# Patient Record
Sex: MALE | Race: WHITE | NOT HISPANIC OR LATINO | Employment: PART TIME | URBAN - METROPOLITAN AREA
[De-identification: names, ages, dates, MRNs, and addresses within clinical notes are randomized per-mention and may not be internally consistent; named-entity substitution may affect disease eponyms.]

---

## 2024-04-20 ENCOUNTER — APPOINTMENT (OUTPATIENT)
Dept: GENERAL RADIOLOGY | Facility: HOSPITAL | Age: 19
End: 2024-04-20
Payer: COMMERCIAL

## 2024-04-20 ENCOUNTER — HOSPITAL ENCOUNTER (EMERGENCY)
Facility: HOSPITAL | Age: 19
Discharge: HOME | End: 2024-04-20
Attending: EMERGENCY MEDICINE
Payer: COMMERCIAL

## 2024-04-20 VITALS
RESPIRATION RATE: 30 BRPM | WEIGHT: 155 LBS | DIASTOLIC BLOOD PRESSURE: 92 MMHG | TEMPERATURE: 98.8 F | SYSTOLIC BLOOD PRESSURE: 128 MMHG | HEART RATE: 102 BPM | BODY MASS INDEX: 22.19 KG/M2 | OXYGEN SATURATION: 98 % | HEIGHT: 70 IN

## 2024-04-20 DIAGNOSIS — S83.005A CLOSED DISLOCATION OF LEFT PATELLA, INITIAL ENCOUNTER: Primary | ICD-10-CM

## 2024-04-20 PROCEDURE — 73560 X-RAY EXAM OF KNEE 1 OR 2: CPT

## 2024-04-20 PROCEDURE — 96374 THER/PROPH/DIAG INJ IV PUSH: CPT

## 2024-04-20 PROCEDURE — 25010000002 FENTANYL CITRATE (PF) 50 MCG/ML SOLUTION: Performed by: EMERGENCY MEDICINE

## 2024-04-20 PROCEDURE — 99285 EMERGENCY DEPT VISIT HI MDM: CPT

## 2024-04-20 PROCEDURE — 96375 TX/PRO/DX INJ NEW DRUG ADDON: CPT

## 2024-04-20 PROCEDURE — 25010000002 HYDROMORPHONE 1 MG/ML SOLUTION: Performed by: EMERGENCY MEDICINE

## 2024-04-20 PROCEDURE — 25010000002 ONDANSETRON PER 1 MG: Performed by: EMERGENCY MEDICINE

## 2024-04-20 RX ORDER — ONDANSETRON 2 MG/ML
4 INJECTION INTRAMUSCULAR; INTRAVENOUS ONCE
Status: COMPLETED | OUTPATIENT
Start: 2024-04-20 | End: 2024-04-20

## 2024-04-20 RX ORDER — HYDROCODONE BITARTRATE AND ACETAMINOPHEN 5; 325 MG/1; MG/1
1 TABLET ORAL ONCE
Status: COMPLETED | OUTPATIENT
Start: 2024-04-20 | End: 2024-04-20

## 2024-04-20 RX ORDER — FENTANYL CITRATE 50 UG/ML
50 INJECTION, SOLUTION INTRAMUSCULAR; INTRAVENOUS ONCE
Status: COMPLETED | OUTPATIENT
Start: 2024-04-20 | End: 2024-04-20

## 2024-04-20 RX ORDER — HYDROCODONE BITARTRATE AND ACETAMINOPHEN 5; 325 MG/1; MG/1
1 TABLET ORAL EVERY 6 HOURS PRN
Qty: 12 TABLET | Refills: 0 | Status: SHIPPED | OUTPATIENT
Start: 2024-04-20

## 2024-04-20 RX ADMIN — FENTANYL CITRATE 50 MCG: 50 INJECTION, SOLUTION INTRAMUSCULAR; INTRAVENOUS at 21:09

## 2024-04-20 RX ADMIN — HYDROMORPHONE HYDROCHLORIDE 0.5 MG: 1 INJECTION, SOLUTION INTRAMUSCULAR; INTRAVENOUS; SUBCUTANEOUS at 20:06

## 2024-04-20 RX ADMIN — ONDANSETRON 4 MG: 2 INJECTION INTRAMUSCULAR; INTRAVENOUS at 20:06

## 2024-04-20 RX ADMIN — HYDROCODONE BITARTRATE AND ACETAMINOPHEN 1 TABLET: 5; 325 TABLET ORAL at 22:45

## 2024-04-21 NOTE — ED PROVIDER NOTES
"Subjective   History of Present Illness  Chief complaint: Patient is a 18-year-old who was playing volleyball he jumped up and fell down landing awkwardly on his knee.  This is his left knee.  He felt a \"pop\".  And there was a deformity with severe pain.  He presented here for further evaluation.  This happened just prior to arrival.  There is no other injury.  No head injury.  No numbness.  No open wound    Context:    Duration:    Timing: Subacute onset    Severity: Severe    Associated Symptoms:        PCP:  LMP:      Review of Systems   Cardiovascular:  Negative for chest pain.   Gastrointestinal:  Negative for abdominal pain.   Genitourinary:  Negative for flank pain.   Musculoskeletal:  Positive for arthralgias.   Skin: Negative.    Neurological: Negative.        No past medical history on file.    No Known Allergies    No past surgical history on file.    No family history on file.    Social History     Socioeconomic History    Marital status: Single           Objective   Physical Exam  Vitals and nursing note reviewed.   Constitutional:       Appearance: Normal appearance.   HENT:      Head: Normocephalic and atraumatic.   Pulmonary:      Effort: Pulmonary effort is normal.   Musculoskeletal:      Left knee: Deformity and bony tenderness present. Decreased range of motion. Tenderness present. Abnormal alignment. Normal pulse.        Legs:       Comments: Patient holding leg externally in severe pain.  Patella appears to be dislocated.  Patient is neurovascular intact distally.   Skin:     General: Skin is warm and dry.      Comments: No open wound present.   Neurological:      Mental Status: He is alert.      Sensory: No sensory deficit.      Motor: No weakness.   Psychiatric:         Mood and Affect: Mood normal.         Thought Content: Thought content normal.         Lower Extremity Dislocation    Date/Time: 4/20/2024 9:20 PM    Performed by: Terry Sal DO  Authorized by: Terry Sal " "DO [place]  Consent: Verbal consent obtained.  Risks and benefits: risks, benefits and alternatives were discussed  Consent given by: patient  Patient understanding: patient states understanding of the procedure being performed  Imaging studies: imaging studies available  Patient identity confirmed: verbally with patient  Time out: Immediately prior to procedure a \"time out\" was called to verify the correct patient, procedure, equipment, support staff and site/side marked as required.  Injury location: knee  Location details: left knee  Injury type: dislocation  Dislocation type: lateral patellar  Pre-procedure neurovascular assessment: neurovascularly intact  Pre-procedure distal perfusion: normal  Pre-procedure neurological function: normal  Pre-procedure range of motion: normal    Anesthesia:  Local anesthesia used: no    Sedation:  Patient sedated: dilaudid and fentanyl.    Manipulation performed: yes  Reduction method: Extension with medial manipulation of patella.  X-ray confirmed reduction: yes  Splint type: knee immobilizer.  Post-procedure distal perfusion: normal  Post-procedure neurological function: normal  Patient tolerance: patient tolerated the procedure well with no immediate complications                 ED Course       Adult Nutrition Assessment       Row Name 04/20/24 1949       Anthropometrics    Height 177.8 cm (70\")    Weight 70.3 kg (155 lb)                                                  XR Knee 1 or 2 View Left    Result Date: 4/20/2024  Impression: Normal location of the patella. No evidence of fracture. Electronically Signed: Harriet Santana MD  4/20/2024 10:07 PM EDT  Workstation ID: [id number]    XR Knee 1 or 2 View Left    Result Date: 4/20/2024  Impression: No evidence of fracture. Previously described lateral patellar subluxation does not appear significantly changed as compared to the previous study although there is a difference in positioning. Correlate clinically. Sunrise view may be " warranted. Electronically Signed: Harriet Santana MD  4/20/2024 9:45 PM EDT  Workstation ID: [id number]    XR Knee 1 or 2 View Left    Result Date: 4/20/2024  Impression: Questionable lateral patellar subluxation although this may be projectional. Sunrise view would be helpful for further evaluation. No fracture identified. Electronically Signed: Harriet Santana MD  4/20/2024 8:31 PM EDT  Workstation ID: [id number]          Medical Decision Making  Patient was seen evaluated for the above problem    Differential diagnose includes but not limited to patella dislocation, patella fracture, knee dislocation, tibial plateau fracture,    Patient was neurovascular intact distally on exam.  No signs of compartment syndrome.  He had x-ray and physical exam consistent with patella dislocation.  After pain medication here in the emergency department he did receive dislocation reduction.  See procedure note.  Patient tolerated procedure well.  Patient was placed in knee immobilizer and provided crutches.  Some pain medicine for home as well.  Orthopedic follow-up provided.  Discussed with patient and family.  They verbalized understanding are okay with this.    Problems Addressed:  Closed dislocation of left patella, initial encounter: complicated acute illness or injury    Amount and/or Complexity of Data Reviewed  Radiology: ordered and independent interpretation performed.     Details: Reviewed by myself as above    Risk  Prescription drug management.  Parenteral controlled substances.        Final diagnoses:   None     Patella dislocation left  ED Disposition  ED Disposition       None            No follow-up provider specified.       Medication List      No changes were made to your prescriptions during this visit.            Terry Sal,   04/20/24 0460